# Patient Record
Sex: MALE | NOT HISPANIC OR LATINO | Employment: STUDENT | URBAN - METROPOLITAN AREA
[De-identification: names, ages, dates, MRNs, and addresses within clinical notes are randomized per-mention and may not be internally consistent; named-entity substitution may affect disease eponyms.]

---

## 2019-10-02 ENCOUNTER — TRANSCRIBE ORDERS (OUTPATIENT)
Dept: RADIOLOGY | Facility: HOSPITAL | Age: 29
End: 2019-10-02

## 2019-10-02 ENCOUNTER — HOSPITAL ENCOUNTER (OUTPATIENT)
Dept: RADIOLOGY | Facility: HOSPITAL | Age: 29
Discharge: HOME | End: 2019-10-02
Payer: COMMERCIAL

## 2019-10-02 DIAGNOSIS — R05.9 COUGH: ICD-10-CM

## 2019-10-02 DIAGNOSIS — R05.9 COUGH: Primary | ICD-10-CM

## 2019-10-02 PROCEDURE — 71046 X-RAY EXAM CHEST 2 VIEWS: CPT

## 2020-09-24 PROBLEM — K62.5 GASTROINTESTINAL HEMORRHAGE ASSOCIATED WITH ANORECTAL SOURCE: Status: ACTIVE | Noted: 2020-09-24

## 2020-10-12 ENCOUNTER — TELEPHONE (OUTPATIENT)
Dept: OTHER | Facility: OTHER | Age: 30
End: 2020-10-12

## 2022-01-25 ENCOUNTER — OFFICE VISIT (OUTPATIENT)
Dept: FAMILY MEDICINE CLINIC | Facility: CLINIC | Age: 32
End: 2022-01-25
Payer: COMMERCIAL

## 2022-01-25 VITALS
HEIGHT: 74 IN | DIASTOLIC BLOOD PRESSURE: 100 MMHG | OXYGEN SATURATION: 99 % | BODY MASS INDEX: 25.05 KG/M2 | HEART RATE: 99 BPM | RESPIRATION RATE: 24 BRPM | TEMPERATURE: 99.5 F | WEIGHT: 195.2 LBS | SYSTOLIC BLOOD PRESSURE: 158 MMHG

## 2022-01-25 DIAGNOSIS — R05.9 COUGH: ICD-10-CM

## 2022-01-25 DIAGNOSIS — R03.0 ELEVATED BLOOD PRESSURE READING: Primary | ICD-10-CM

## 2022-01-25 PROCEDURE — 3008F BODY MASS INDEX DOCD: CPT | Performed by: FAMILY MEDICINE

## 2022-01-25 PROCEDURE — 1036F TOBACCO NON-USER: CPT | Performed by: FAMILY MEDICINE

## 2022-01-25 PROCEDURE — 3725F SCREEN DEPRESSION PERFORMED: CPT | Performed by: FAMILY MEDICINE

## 2022-01-25 PROCEDURE — 99203 OFFICE O/P NEW LOW 30 MIN: CPT | Performed by: FAMILY MEDICINE

## 2022-01-25 RX ORDER — METHYLPREDNISOLONE 4 MG/1
TABLET ORAL
COMMUNITY
Start: 2022-01-21 | End: 2022-01-25

## 2022-01-25 NOTE — PROGRESS NOTES
Assessment/Plan: blood pressure may be related to patient being on Medrol  Patient will complete and will have blood pressure rechecked at follow-up visit  Patient have laboratory studies done  Recommend low-salt diet  Patient will see ENT if patient has multiple bouts of recurrent tonsillitis  Patient will follow-up in 1 month       Diagnoses and all orders for this visit:    Elevated blood pressure reading  -     CBC and differential; Future  -     Comprehensive metabolic panel; Future  -     Lipid panel; Future  -     TSH, 3rd generation with Free T4 reflex; Future    Cough  -     CBC and differential; Future  -     Comprehensive metabolic panel; Future  -     Lipid panel; Future  -     TSH, 3rd generation with Free T4 reflex; Future    Other orders  -     Discontinue: methylPREDNISolone 4 MG tablet therapy pack; Take as directed per package instructions  Subjective:        Patient ID: Parmjit Lu is a 27 y o  male  Patient is here as a new patient to establish care  Past medical history surgical history allergies medications family history and social history reviewed at this time  Patient with recent sore throat  Patient completed antibiotics  Patient complaining Medrol Dosepak  He patient using lozenges  The mild cough lingering patient with some mucus production associated with this  No fever  Patient concerned about blood pressure  This is been elevated intermittently in the past   Patient is on Medrol Dosepak  Routinely no chest pain or shortness of breath at rest   Patient does get some shortness of breath with significant exertion  No significant problems with urination or defecation at this time  Patient diagnosed with influenza B two week ago  Patient COVID negative          The following portions of the patient's history were reviewed and updated as appropriate: allergies, current medications, past family history, past medical history, past social history, past surgical history and problem list       Review of Systems   Constitutional: Negative  HENT: Positive for sore throat  Eyes: Negative  Respiratory: Positive for cough  Cardiovascular: Negative  Gastrointestinal: Negative  Endocrine: Negative  Genitourinary: Negative  Musculoskeletal: Negative  Skin: Negative  Allergic/Immunologic: Negative  Neurological: Negative  Hematological: Negative  Psychiatric/Behavioral: Negative  Objective:      BMI Counseling: Body mass index is 25 4 kg/m²  The BMI is above normal  Nutrition recommendations include decreasing portion sizes  Exercise recommendations include moderate physical activity 150 minutes/week  Rationale for BMI follow-up plan is due to patient being overweight or obese  /100 (BP Location: Right arm, Patient Position: Sitting, Cuff Size: Standard)   Pulse 99   Temp 99 5 °F (37 5 °C) (Tympanic)   Resp (!) 24   Ht 6' 1 5" (1 867 m)   Wt 88 5 kg (195 lb 3 2 oz)   SpO2 99%   BMI 25 40 kg/m²          Physical Exam  Vitals and nursing note reviewed  Constitutional:       General: He is not in acute distress  Appearance: Normal appearance  He is not ill-appearing, toxic-appearing or diaphoretic  HENT:      Head: Normocephalic and atraumatic  Right Ear: Tympanic membrane, ear canal and external ear normal  There is no impacted cerumen  Left Ear: Tympanic membrane, ear canal and external ear normal  There is no impacted cerumen  Nose: Nose normal  No congestion or rhinorrhea  Mouth/Throat:      Mouth: Mucous membranes are moist       Pharynx: No oropharyngeal exudate or posterior oropharyngeal erythema  Eyes:      General: No scleral icterus  Right eye: No discharge  Left eye: No discharge  Extraocular Movements: Extraocular movements intact  Conjunctiva/sclera: Conjunctivae normal       Pupils: Pupils are equal, round, and reactive to light  Neck:      Vascular: No carotid bruit  Cardiovascular:      Rate and Rhythm: Normal rate and regular rhythm  Pulses: Normal pulses  Heart sounds: Normal heart sounds  No murmur heard  No friction rub  No gallop  Pulmonary:      Effort: Pulmonary effort is normal  No respiratory distress  Breath sounds: Normal breath sounds  No stridor  No wheezing, rhonchi or rales  Chest:      Chest wall: No tenderness  Musculoskeletal:         General: No swelling, tenderness, deformity or signs of injury  Normal range of motion  Cervical back: Normal range of motion and neck supple  No rigidity  No muscular tenderness  Right lower leg: No edema  Left lower leg: No edema  Lymphadenopathy:      Cervical: No cervical adenopathy  Skin:     General: Skin is warm and dry  Capillary Refill: Capillary refill takes less than 2 seconds  Coloration: Skin is not jaundiced  Findings: No bruising, erythema, lesion or rash  Neurological:      Mental Status: He is alert and oriented to person, place, and time  Mental status is at baseline  Cranial Nerves: No cranial nerve deficit  Sensory: No sensory deficit  Motor: No weakness  Coordination: Coordination normal       Gait: Gait normal    Psychiatric:         Mood and Affect: Mood normal          Behavior: Behavior normal          Thought Content:  Thought content normal          Judgment: Judgment normal

## 2022-03-09 ENCOUNTER — OFFICE VISIT (OUTPATIENT)
Dept: FAMILY MEDICINE CLINIC | Facility: CLINIC | Age: 32
End: 2022-03-09
Payer: COMMERCIAL

## 2022-03-09 VITALS
OXYGEN SATURATION: 100 % | HEART RATE: 98 BPM | DIASTOLIC BLOOD PRESSURE: 90 MMHG | WEIGHT: 189 LBS | HEIGHT: 73 IN | SYSTOLIC BLOOD PRESSURE: 120 MMHG | TEMPERATURE: 96.6 F | BODY MASS INDEX: 25.05 KG/M2

## 2022-03-09 DIAGNOSIS — E78.6 LOW HDL (UNDER 40): ICD-10-CM

## 2022-03-09 DIAGNOSIS — R05.9 COUGH: ICD-10-CM

## 2022-03-09 DIAGNOSIS — R74.01 ELEVATED ALT MEASUREMENT: ICD-10-CM

## 2022-03-09 DIAGNOSIS — I10 PRIMARY HYPERTENSION: Primary | ICD-10-CM

## 2022-03-09 DIAGNOSIS — K21.9 GASTROESOPHAGEAL REFLUX DISEASE WITHOUT ESOPHAGITIS: ICD-10-CM

## 2022-03-09 PROCEDURE — 1036F TOBACCO NON-USER: CPT | Performed by: FAMILY MEDICINE

## 2022-03-09 PROCEDURE — 3008F BODY MASS INDEX DOCD: CPT | Performed by: FAMILY MEDICINE

## 2022-03-09 PROCEDURE — 99214 OFFICE O/P EST MOD 30 MIN: CPT | Performed by: FAMILY MEDICINE

## 2022-03-09 RX ORDER — OMEPRAZOLE 20 MG/1
20 CAPSULE, DELAYED RELEASE ORAL
Qty: 30 CAPSULE | Refills: 5 | Status: SHIPPED | OUTPATIENT
Start: 2022-03-09

## 2022-03-09 NOTE — PROGRESS NOTES
Assessment/Plan:  CBC normal ALT 89  Rest of CMP normal     HDL 33  TSH 2 0  Patient will try to exercise given low HDL  Patient will have dash diet and other pamphlet and information given regarding hypertension  Patient use Colace for constipation  Patient will use omeprazole for GERD/chronic cough which is stable  Patient will follow-up in 2-3 months  Patient will have laboratory studies to recheck elevated ALT  Diagnoses and all orders for this visit:    Primary hypertension    Cough    Gastroesophageal reflux disease without esophagitis  -     omeprazole (PriLOSEC) 20 mg delayed release capsule; Take 1 capsule (20 mg total) by mouth daily before breakfast    Elevated ALT measurement  -     CBC and differential; Future  -     Comprehensive metabolic panel; Future  -     ANAMIKA Screen w/ Reflex to Titer/Pattern; Future  -     C-reactive protein; Future  -     Hepatitis panel, acute; Future  -     Ferritin; Future  -     Ceruloplasmin; Future    Low HDL (under 40)            Subjective:        Patient ID: Elroy Aaron is a 27 y o  male  Patient is here to follow-up on hypertension as well as GERD with chronic cough  Patient did start omeprazole OTC and cough has resolved  No chest pain shortness of breath or abdominal pain  No change urination  Patient did have some neck pain when coughing  No radicular symptoms  Patient did use Tylenol  This is resolved  Patient with full range of motion at this time  No previous cervical trauma  The following portions of the patient's history were reviewed and updated as appropriate: allergies, current medications, past family history, past medical history, past social history, past surgical history and problem list       Review of Systems   Constitutional: Negative  HENT: Negative  Eyes: Negative  Respiratory: Negative  Cardiovascular: Negative  Gastrointestinal: Negative  Endocrine: Negative  Genitourinary: Negative  Musculoskeletal: Negative  Skin: Negative  Allergic/Immunologic: Negative  Neurological: Negative  Hematological: Negative  Psychiatric/Behavioral: Negative  Objective:               /90   Pulse 98   Temp (!) 96 6 °F (35 9 °C)   Ht 6' 1" (1 854 m)   Wt 85 7 kg (189 lb)   SpO2 100%   BMI 24 94 kg/m²          Physical Exam  Vitals and nursing note reviewed  Constitutional:       General: He is not in acute distress  Appearance: Normal appearance  He is not ill-appearing, toxic-appearing or diaphoretic  HENT:      Head: Normocephalic and atraumatic  Right Ear: Tympanic membrane, ear canal and external ear normal  There is no impacted cerumen  Left Ear: Tympanic membrane, ear canal and external ear normal  There is no impacted cerumen  Nose: Nose normal  No congestion or rhinorrhea  Mouth/Throat:      Mouth: Mucous membranes are moist       Pharynx: No oropharyngeal exudate or posterior oropharyngeal erythema  Eyes:      General: No scleral icterus  Right eye: No discharge  Left eye: No discharge  Extraocular Movements: Extraocular movements intact  Conjunctiva/sclera: Conjunctivae normal       Pupils: Pupils are equal, round, and reactive to light  Neck:      Vascular: No carotid bruit  Cardiovascular:      Rate and Rhythm: Normal rate and regular rhythm  Pulses: Normal pulses  Heart sounds: Normal heart sounds  No murmur heard  No friction rub  No gallop  Pulmonary:      Effort: Pulmonary effort is normal  No respiratory distress  Breath sounds: Normal breath sounds  No stridor  No wheezing, rhonchi or rales  Chest:      Chest wall: No tenderness  Musculoskeletal:         General: No swelling, tenderness, deformity or signs of injury  Normal range of motion  Cervical back: Normal range of motion and neck supple  No rigidity  No muscular tenderness  Right lower leg: No edema        Left lower leg: No edema  Lymphadenopathy:      Cervical: No cervical adenopathy  Skin:     General: Skin is warm and dry  Capillary Refill: Capillary refill takes less than 2 seconds  Coloration: Skin is not jaundiced  Findings: No bruising, erythema, lesion or rash  Neurological:      Mental Status: He is alert and oriented to person, place, and time  Mental status is at baseline  Cranial Nerves: No cranial nerve deficit  Sensory: No sensory deficit  Motor: No weakness  Coordination: Coordination normal       Gait: Gait normal    Psychiatric:         Mood and Affect: Mood normal          Behavior: Behavior normal          Thought Content:  Thought content normal          Judgment: Judgment normal

## 2022-03-11 ENCOUNTER — TELEPHONE (OUTPATIENT)
Dept: FAMILY MEDICINE CLINIC | Facility: CLINIC | Age: 32
End: 2022-03-11

## 2022-03-11 NOTE — TELEPHONE ENCOUNTER
Patient called stating that he did not know that he was supposed to fast for the ordered CMP  Spoke with Vonnie Card  And was informed that he could do the test if he wished, but his numbers would be a little off  Patient decided to go have the test done

## 2022-10-12 PROBLEM — R05.9 COUGH: Status: RESOLVED | Noted: 2022-01-25 | Resolved: 2022-10-12

## 2023-02-23 ENCOUNTER — OFFICE VISIT (OUTPATIENT)
Dept: FAMILY MEDICINE CLINIC | Facility: CLINIC | Age: 33
End: 2023-02-23

## 2023-02-23 VITALS
SYSTOLIC BLOOD PRESSURE: 182 MMHG | OXYGEN SATURATION: 99 % | HEART RATE: 87 BPM | TEMPERATURE: 97.5 F | HEIGHT: 73 IN | DIASTOLIC BLOOD PRESSURE: 94 MMHG | WEIGHT: 192.8 LBS | BODY MASS INDEX: 25.55 KG/M2

## 2023-02-23 DIAGNOSIS — Z00.00 WELL ADULT EXAM: Primary | ICD-10-CM

## 2023-02-23 DIAGNOSIS — Z23 ENCOUNTER FOR IMMUNIZATION: ICD-10-CM

## 2023-02-23 NOTE — PROGRESS NOTES
Assessment/Plan: Vaccines reviewed  Adacel shot given at this time  Patient have laboratory studies done  Labs reviewed  No family history of colon or prostate cancer  Patient will have low-salt diet at this time patient also exercising and diet appropriately  Patient [place]  Patient does not wish to start medication for hypertension at this time  Patient understands risk  Recheck blood pressure at surgical biopsy 2 months       Diagnoses and all orders for this visit:    Well adult exam  -     CBC and differential; Future  -     Comprehensive metabolic panel; Future  -     Lipid panel; Future  -     TSH, 3rd generation with Free T4 reflex; Future  -     CBC and differential; Future  -     Comprehensive metabolic panel; Future  -     Lipid panel; Future  -     TSH, 3rd generation with Free T4 reflex; Future            Subjective:        Patient ID: Joey Ruggiero is a 34 y o  male  Patient is here for wellness exam   Labs and vaccines reviewed  No family history of colon or prostate cancer  Patient with rash behind right ear for roughly 1 month  No significant pain or pruritus noted  No topical treatment used as far as creams  The following portions of the patient's history were reviewed and updated as appropriate: allergies, current medications, past family history, past medical history, past social history, past surgical history and problem list       Review of Systems   Constitutional: Negative  HENT: Negative  Eyes: Negative  Respiratory: Negative  Cardiovascular: Negative  Gastrointestinal: Negative  Endocrine: Negative  Genitourinary: Negative  Musculoskeletal: Negative  Skin: Positive for rash  Allergic/Immunologic: Negative  Neurological: Negative  Hematological: Negative  Psychiatric/Behavioral: Negative  Objective:      BMI Counseling: Body mass index is 25 44 kg/m²   The BMI is above normal  Nutrition recommendations include consuming healthier snacks  Exercise recommendations include moderate physical activity 150 minutes/week  Rationale for BMI follow-up plan is due to patient being overweight or obese  Depression Screening and Follow-up Plan: Patient was screened for depression during today's encounter  They screened negative with a PHQ-2 score of 0             BP (!) 180/90 (BP Location: Right arm, Patient Position: Sitting, Cuff Size: Standard)   Pulse 87   Temp 97 5 °F (36 4 °C) (Temporal)   Ht 6' 1" (1 854 m)   Wt 87 5 kg (192 lb 12 8 oz)   SpO2 99%   BMI 25 44 kg/m²          Physical Exam  Vitals and nursing note reviewed  Constitutional:       General: He is not in acute distress  Appearance: Normal appearance  He is not ill-appearing, toxic-appearing or diaphoretic  HENT:      Head: Normocephalic and atraumatic  Right Ear: Tympanic membrane, ear canal and external ear normal  There is no impacted cerumen  Left Ear: Tympanic membrane, ear canal and external ear normal  There is no impacted cerumen  Nose: Nose normal  No congestion or rhinorrhea  Mouth/Throat:      Mouth: Mucous membranes are moist       Pharynx: No oropharyngeal exudate or posterior oropharyngeal erythema  Eyes:      General: No scleral icterus  Right eye: No discharge  Left eye: No discharge  Extraocular Movements: Extraocular movements intact  Conjunctiva/sclera: Conjunctivae normal       Pupils: Pupils are equal, round, and reactive to light  Neck:      Vascular: No carotid bruit  Cardiovascular:      Rate and Rhythm: Normal rate and regular rhythm  Pulses: Normal pulses  Heart sounds: Normal heart sounds  No murmur heard  No friction rub  No gallop  Pulmonary:      Effort: Pulmonary effort is normal  No respiratory distress  Breath sounds: Normal breath sounds  No stridor  No wheezing, rhonchi or rales     Chest: Chest wall: No tenderness  Abdominal:      General: There is no distension  Musculoskeletal:         General: No swelling, tenderness, deformity or signs of injury  Normal range of motion  Cervical back: Normal range of motion and neck supple  No rigidity  No muscular tenderness  Right lower leg: No edema  Left lower leg: No edema  Lymphadenopathy:      Cervical: No cervical adenopathy  Skin:     General: Skin is warm and dry  Capillary Refill: Capillary refill takes less than 2 seconds  Coloration: Skin is not jaundiced  Findings: Lesion present  No bruising, erythema or rash  Comments: Small nodule behind right ear   Neurological:      Mental Status: He is alert and oriented to person, place, and time  Mental status is at baseline  Cranial Nerves: No cranial nerve deficit  Sensory: No sensory deficit  Motor: No weakness  Coordination: Coordination normal       Gait: Gait normal    Psychiatric:         Mood and Affect: Mood normal          Behavior: Behavior normal          Thought Content:  Thought content normal          Judgment: Judgment normal

## 2023-04-24 PROBLEM — Z00.00 WELL ADULT EXAM: Status: RESOLVED | Noted: 2023-02-23 | Resolved: 2023-04-24

## 2023-07-18 ENCOUNTER — OFFICE VISIT (OUTPATIENT)
Dept: FAMILY MEDICINE CLINIC | Facility: CLINIC | Age: 33
End: 2023-07-18
Payer: COMMERCIAL

## 2023-07-18 VITALS
BODY MASS INDEX: 26.11 KG/M2 | HEART RATE: 84 BPM | HEIGHT: 73 IN | TEMPERATURE: 97.4 F | WEIGHT: 197 LBS | DIASTOLIC BLOOD PRESSURE: 100 MMHG | SYSTOLIC BLOOD PRESSURE: 150 MMHG | OXYGEN SATURATION: 99 %

## 2023-07-18 DIAGNOSIS — I10 PRIMARY HYPERTENSION: ICD-10-CM

## 2023-07-18 DIAGNOSIS — S39.012A STRAIN OF LUMBAR REGION, INITIAL ENCOUNTER: Primary | ICD-10-CM

## 2023-07-18 DIAGNOSIS — D49.2 SKIN NEOPLASM: ICD-10-CM

## 2023-07-18 PROCEDURE — 99214 OFFICE O/P EST MOD 30 MIN: CPT | Performed by: FAMILY MEDICINE

## 2023-07-18 NOTE — PROGRESS NOTES
Assessment/Plan: Patient to go to physical therapy. Patient will use Advil as needed. Patient will see dermatology regarding skin lesion on back and behind right ear. CMP CBC TSH lipid profile reviewed. Patient wishes to hold off with antihypertensive medications at present time. Patient was read to have DASH diet/low-salt diet. Follow-up in the next few months. Diagnoses and all orders for this visit:    Strain of lumbar region, initial encounter  -     Ambulatory Referral to Physical Therapy; Future    Skin neoplasm  -     Ambulatory Referral to Dermatology; Future    Primary hypertension            Subjective:        Patient ID: Juni Mancuso is a 34 y.o. male. Patient is here with right-sided lumbar pain over the past 2 weeks or so. Patient status post gym. Patient without any change urination or defecation. No saddle anesthesia. No radicular symptoms in the lower extremities. No fevers noted. Patient did use Advil. Back Pain          The following portions of the patient's history were reviewed and updated as appropriate: allergies, current medications, past family history, past medical history, past social history, past surgical history and problem list.      Review of Systems   Constitutional: Negative. HENT: Negative. Eyes: Negative. Respiratory: Negative. Cardiovascular: Negative. Gastrointestinal: Negative. Endocrine: Negative. Genitourinary: Negative. Musculoskeletal: Positive for back pain. Skin: Negative. Allergic/Immunologic: Negative. Neurological: Negative. Hematological: Negative. Psychiatric/Behavioral: Negative. Objective:               /100   Pulse 84   Temp (!) 97.4 °F (36.3 °C)   Ht 6' 1" (1.854 m)   Wt 89.4 kg (197 lb)   SpO2 99%   BMI 25.99 kg/m²          Physical Exam  Vitals and nursing note reviewed. Constitutional:       General: He is not in acute distress. Appearance: Normal appearance.  He is not ill-appearing, toxic-appearing or diaphoretic. Musculoskeletal:         General: Tenderness present. Comments: Minimal pain at the L4-S1 region on the right lumbar region. Some discomfort with forward flexion but not with extension. Negative straight leg raise bilaterally. Gross sensation to light touch intact bilaterally. Skin:     Findings: Lesion present. Comments: Skin neoplasm behind right ear. Neurological:      Mental Status: He is alert.

## 2023-07-20 ENCOUNTER — EVALUATION (OUTPATIENT)
Dept: PHYSICAL THERAPY | Facility: REHABILITATION | Age: 33
End: 2023-07-20
Payer: COMMERCIAL

## 2023-07-20 DIAGNOSIS — S39.012A STRAIN OF LUMBAR REGION, INITIAL ENCOUNTER: ICD-10-CM

## 2023-07-20 PROCEDURE — 97161 PT EVAL LOW COMPLEX 20 MIN: CPT | Performed by: PHYSICAL THERAPIST

## 2023-07-20 PROCEDURE — 97110 THERAPEUTIC EXERCISES: CPT | Performed by: PHYSICAL THERAPIST

## 2023-07-20 NOTE — PROGRESS NOTES
PT Evaluation     Today's date: 2023  Patient name: Lee Ann Cash  : 1990  MRN: [id number]  Referring provider: Eric Moore DO  Dx:   Encounter Diagnosis     ICD-10-CM    1. Strain of lumbar region, initial encounter  S39.012A Ambulatory Referral to Physical Therapy          Start Time:   Stop Time: 1700  Total time in clinic (min): 45 minutes    Assessment  Assessment details: Problem List:  1) R hip hypomobility  2) Decreased R gluteus medius weakness    Lee Ann Cash is a pleasant 34 y.o. male who presents with R sided low back pain following lifting at the gym. Pain went away, but was re-aggravated when lifting an item at the grocery store. He has R hip hypomobility, decreased R gluteus medius strength, and pain when squatting resulting in the pain he is experiencing, worry over not knowing what's wrong, concern at no signs of improvement and fear of not being able to keep active. No further referral appears necessary at this time based upon examination results. I expect he will improve in 2-3 weeks. Positive prognostic indicators include positive attitude toward recovery and good understanding of diagnosis and treatment plan options. Negative prognostic indicators include hypertension. Jeny would benefit from skilled physical therapy to address his limitations and allow him to return to lifting and working out without pain. Impairments: abnormal or restricted ROM, activity intolerance, impaired physical strength, lacks appropriate home exercise program, pain with function, weight-bearing intolerance and poor posture     Symptom irritability: lowUnderstanding of Dx/Px/POC: good   Prognosis: good    Goals  ST-4 weeks  Patient will be independent with home exercise program.   Patient will be able to manage symptoms independently.   Patient will decrease pain by 25-50%    LTG: by discharge  Patient will improve FOTO to goal  Patient will report minimal (1-2/10) pain with aggravating activities to display improvements in overall functional status  Patient will return to squatting to allow him to get back to his gym routine     Plan  Patient would benefit from: skilled physical therapy  Planned modality interventions: cryotherapy, thermotherapy: hydrocollator packs and unattended electrical stimulation  Planned therapy interventions: IADL retraining, joint mobilization, manual therapy, massage, ADL training, activity modification, abdominal trunk stabilization, ADL retraining, balance, balance/weight bearing training, neuromuscular re-education, body mechanics training, behavior modification, strengthening, stretching, therapeutic activities, therapeutic exercise, therapeutic training, transfer training, graded exercise, graded motor, home exercise program, graded activity, gait training, functional ROM exercises, patient education, postural training, flexibility and nerve gliding  Frequency: 2x week  Duration in visits: 16  Duration in weeks: 8  Treatment plan discussed with: patient        Subjective Evaluation    History of Present Illness  Mechanism of injury: Daniel Sarmiento is a 34 y.o. male presenting to physical therapy on 23 with referral from MD for right sided back pain that began when he was lifting at the gym. Pain did not start until the day after. Pain did improve, but then it was re-aggravated when he was lifting at the grocery. Reports some pain in his groin/testicular area that began shortly after his back started bothering him. Denies pain with coughing/sneezing. Denies numbness/tingling or pain down his leg. Reports some problems sleeping, but pain is almost gone.          Quality of life: good    Patient Goals  Patient goals for therapy: increased strength, independence with ADLs/IADLs, return to sport/leisure activities, decreased pain and increased motion    Pain  Current pain ratin  At best pain ratin  At worst pain ratin  Quality: sharp and dull ache  Relieving factors: rest and relaxation  Aggravating factors: lifting  Progression: improved          Objective    Myotomes: all intact b/l  Dermatome: all intact b/l     GAIT  Squat assess:     Lumbar Spine Protective Mechanism: (+) post/ant on R    Lumbar  % of normal   Flex. 100%   Extn. 100%   SG Left 100% P   SB Right 100%   ROT Left 100%   ROT Right 100%         MMT         AROM          PROM    Hip       L       R        L           R      L     R   Flex. 5 5       Extn. Abd. Add. IR.         ER.                  G. Max 5 5       G. Med. 4+ 4       Iliop.              SI joint:            [place] scan=  (-) medial malleoli           Segmental mobility:   LS= normal              Precautions: HTN    Manuals 7/20                                                                Neuro Re-Ed 7/20                                                                                                       Ther Ex 7/20            Clamshell with sideplank HEP            Hand heel rock IR HEP            [place] stretch HEP            Squat assess QD                                                   HEP/education 9'            Ther Activity                                       Gait Training                                       Modalities

## 2024-01-05 ENCOUNTER — OFFICE VISIT (OUTPATIENT)
Dept: FAMILY MEDICINE CLINIC | Facility: CLINIC | Age: 34
End: 2024-01-05
Payer: COMMERCIAL

## 2024-01-05 VITALS
SYSTOLIC BLOOD PRESSURE: 138 MMHG | OXYGEN SATURATION: 99 % | TEMPERATURE: 98.8 F | DIASTOLIC BLOOD PRESSURE: 86 MMHG | HEIGHT: 73 IN | WEIGHT: 201.8 LBS | BODY MASS INDEX: 26.74 KG/M2 | HEART RATE: 73 BPM

## 2024-01-05 DIAGNOSIS — Z23 ENCOUNTER FOR IMMUNIZATION: ICD-10-CM

## 2024-01-05 DIAGNOSIS — L81.4 LENTIGO: Primary | ICD-10-CM

## 2024-01-05 DIAGNOSIS — Z71.84 TRAVEL ADVICE ENCOUNTER: ICD-10-CM

## 2024-01-05 PROCEDURE — 90686 IIV4 VACC NO PRSV 0.5 ML IM: CPT

## 2024-01-05 PROCEDURE — 99213 OFFICE O/P EST LOW 20 MIN: CPT | Performed by: FAMILY MEDICINE

## 2024-01-05 PROCEDURE — 90471 IMMUNIZATION ADMIN: CPT

## 2024-01-05 RX ORDER — DOXYCYCLINE HYCLATE 100 MG/1
100 CAPSULE ORAL EVERY 12 HOURS SCHEDULED
Qty: 14 CAPSULE | Refills: 0 | Status: SHIPPED | OUTPATIENT
Start: 2024-01-05 | End: 2024-01-12

## 2024-01-05 NOTE — PROGRESS NOTES
"Assessment/Plan: Patient will need flu shot and hepatitis A.  Patient will be given prescription for doxycycline to be used as needed for cholera if this would occur.  Patient will observe lentigo.  All questions answered.  Follow-up as needed       Diagnoses and all orders for this visit:    Lentigo    Encounter for immunization  -     influenza vaccine, quadrivalent, 0.5 mL, preservative-free, for adult and pediatric patients 6 mos+ (AFLURIA, FLUARIX, FLULAVAL, FLUZONE)    Travel advice encounter  -     doxycycline hyclate (VIBRAMYCIN) 100 mg capsule; Take 1 capsule (100 mg total) by mouth every 12 (twelve) hours for 7 days            Subjective:        Patient ID: Jeny Guerra is a 33 y.o. male.      Patient is here for vaccines.  Patient traveling to [facility] at the end of January.  Patient also with skin lesion on right posterior shoulder for multiple years.  No significant change.          The following portions of the patient's history were reviewed and updated as appropriate: allergies, current medications, past family history, past medical history, past social history, past surgical history and problem list.      Review of Systems   Constitutional: Negative.    HENT: Negative.     Eyes: Negative.    Respiratory: Negative.     Cardiovascular: Negative.    Gastrointestinal: Negative.    Endocrine: Negative.    Genitourinary: Negative.    Musculoskeletal: Negative.    Skin:  Positive for color change.   Allergic/Immunologic: Negative.    Neurological: Negative.    Hematological: Negative.    Psychiatric/Behavioral: Negative.             Objective:               /86 (BP Location: Left arm, Patient Position: Sitting, Cuff Size: Standard)   Pulse 73   Temp 98.8 °F (37.1 °C) (Temporal)   Ht 6' 1\" (1.854 m)   Wt 91.5 kg (201 lb 12.8 oz)   SpO2 99%   BMI 26.62 kg/m²          Physical Exam  Vitals and nursing note reviewed.   Constitutional:       Appearance: Normal appearance.   Skin:     Findings: " Lesion present.      Comments: Lentigo right posterior shoulder.   Neurological:      Mental Status: He is alert.

## 2024-01-19 ENCOUNTER — TELEPHONE (OUTPATIENT)
Age: 34
End: 2024-01-19

## 2024-01-19 ENCOUNTER — TELEPHONE (OUTPATIENT)
Dept: FAMILY MEDICINE CLINIC | Facility: CLINIC | Age: 34
End: 2024-01-19

## 2024-01-19 DIAGNOSIS — Z71.84 TRAVEL ADVICE ENCOUNTER: Primary | ICD-10-CM

## 2024-01-19 RX ORDER — DOXYCYCLINE HYCLATE 100 MG/1
100 CAPSULE ORAL EVERY 12 HOURS SCHEDULED
Qty: 14 CAPSULE | Refills: 0 | Status: SHIPPED | OUTPATIENT
Start: 2024-01-19 | End: 2024-01-26

## 2024-01-19 NOTE — TELEPHONE ENCOUNTER
Patient calling to ask about having prescription transferred to his local pharmacy. He is out of town currently and did not pick it up at the time it was ordered. Warm transfer successful to Rx refill specialist Nita for further assistance.

## 2024-01-19 NOTE — TELEPHONE ENCOUNTER
YASMIN Camacho    Patient would like to get this medication refilled, it isn't on his current med list.    Patient is in [place] and needs a refill    doxycycline hyclate (VIBRAMYCIN) 100 mg capsule   14 Capsules    Pharmacy:  [address] [address]  Phone: [phone]     Patient: [phone]

## 2024-03-15 ENCOUNTER — TELEPHONE (OUTPATIENT)
Age: 34
End: 2024-03-15

## 2024-03-15 ENCOUNTER — OFFICE VISIT (OUTPATIENT)
Dept: FAMILY MEDICINE CLINIC | Facility: CLINIC | Age: 34
End: 2024-03-15
Payer: COMMERCIAL

## 2024-03-15 VITALS
HEIGHT: 73 IN | OXYGEN SATURATION: 98 % | WEIGHT: 201 LBS | RESPIRATION RATE: 16 BRPM | TEMPERATURE: 98 F | SYSTOLIC BLOOD PRESSURE: 120 MMHG | DIASTOLIC BLOOD PRESSURE: 80 MMHG | HEART RATE: 90 BPM | BODY MASS INDEX: 26.64 KG/M2

## 2024-03-15 DIAGNOSIS — Z00.00 WELL ADULT EXAM: Primary | ICD-10-CM

## 2024-03-15 DIAGNOSIS — T78.40XA ALLERGIC REACTION, INITIAL ENCOUNTER: ICD-10-CM

## 2024-03-15 LAB
ALBUMIN SERPL-MCNC: 4.6 G/DL (ref 3.5–5.7)
ALP SERPL-CCNC: 69 U/L (ref 35–120)
ALT SERPL-CCNC: 16 U/L
ANION GAP SERPL CALCULATED.3IONS-SCNC: 10 MMOL/L (ref 3–11)
AST SERPL-CCNC: 19 U/L
BASOPHILS # BLD AUTO: 0 THOU/CMM (ref 0–0.1)
BASOPHILS NFR BLD AUTO: 0 %
BILIRUB SERPL-MCNC: 0.6 MG/DL (ref 0.2–1)
BUN SERPL-MCNC: 16 MG/DL (ref 7–28)
CALCIUM SERPL-MCNC: 9 MG/DL (ref 8.5–10.1)
CHLORIDE SERPL-SCNC: 103 MMOL/L (ref 100–109)
CHOLEST SERPL-MCNC: 152 MG/DL
CHOLEST/HDLC SERPL: 3.5 {RATIO}
CO2 SERPL-SCNC: 27 MMOL/L (ref 21–31)
CREAT SERPL-MCNC: 0.84 MG/DL (ref 0.53–1.3)
CYTOLOGY CMNT CVX/VAG CYTO-IMP: NORMAL
DIFFERENTIAL METHOD BLD: NORMAL
EOSINOPHIL # BLD AUTO: 0.2 THOU/CMM (ref 0–0.5)
EOSINOPHIL NFR BLD AUTO: 4 %
ERYTHROCYTE [DISTWIDTH] IN BLOOD BY AUTOMATED COUNT: 13.2 % (ref 12–16)
GFR/BSA.PRED SERPLBLD CYS-BASED-ARV: 117 ML/MIN/{1.73_M2}
GLUCOSE SERPL-MCNC: 88 MG/DL (ref 65–99)
HCT VFR BLD AUTO: 45.2 % (ref 37–48)
HDLC SERPL-MCNC: 44 MG/DL (ref 23–92)
HGB BLD-MCNC: 15.8 G/DL (ref 12.5–17)
LDLC SERPL CALC-MCNC: 87 MG/DL
LYMPHOCYTES # BLD AUTO: 1.4 THOU/CMM (ref 1–3)
LYMPHOCYTES NFR BLD AUTO: 24 %
MCH RBC QN AUTO: 29.9 PG (ref 27–36)
MCHC RBC AUTO-ENTMCNC: 35 G/DL (ref 32–37)
MCV RBC AUTO: 86 FL (ref 80–100)
MONOCYTES # BLD AUTO: 0.4 THOU/CMM (ref 0.3–1)
MONOCYTES NFR BLD AUTO: 7 %
NEUTROPHILS # BLD AUTO: 3.7 THOU/CMM (ref 1.8–7.8)
NEUTROPHILS NFR BLD AUTO: 65 %
NONHDLC SERPL-MCNC: 108 MG/DL
PLATELET # BLD AUTO: 212 THOU/CMM (ref 140–350)
PMV BLD REES-ECKER: 8.4 FL (ref 7.5–11.3)
POTASSIUM SERPL-SCNC: 4 MMOL/L (ref 3.5–5.2)
PROT SERPL-MCNC: 6.9 G/DL (ref 6.3–8.3)
RBC # BLD AUTO: 5.28 MILL/CMM (ref 4–5.4)
SODIUM SERPL-SCNC: 140 MMOL/L (ref 135–145)
TRIGL SERPL-MCNC: 103 MG/DL
TSH SERPL-ACNC: 3.07 UIU/ML (ref 0.45–5.33)
WBC # BLD AUTO: 5.7 THOU/CMM (ref 4–10.5)

## 2024-03-15 PROCEDURE — 99395 PREV VISIT EST AGE 18-39: CPT | Performed by: FAMILY MEDICINE

## 2024-03-15 RX ORDER — PREDNISONE 10 MG/1
TABLET ORAL
Qty: 30 TABLET | Refills: 0 | Status: SHIPPED | OUTPATIENT
Start: 2024-03-15

## 2024-03-15 NOTE — TELEPHONE ENCOUNTER
Spoke with pt, he was confused about the Benadryl.  Told him it was over the counter,  he understood

## 2024-03-15 NOTE — TELEPHONE ENCOUNTER
Pt called in saying he was seen at the practice today by Dr. Brown and during the visit he was told will be prescribing with 2 difference pills. But he has just collected the med from pharmacy received only 1 pill asking doctor to send in the prescription for the other pill too so he can collect it today. Please do call him to update on the same.

## 2024-03-15 NOTE — PROGRESS NOTES
Assessment/Plan: Wellness exam done at this time.  Vaccines up-to-date.  Patient further laboratory studies at this time.  Patient will have laboratory studies for allergic reaction.  Patient will go back to using the old soap that he used to use.  Patient will refrain from using band as well as creatine at this time.  Patient has Benadryl at night.  Patient use prednisone taper as directed.       Diagnoses and all orders for this visit:    Well adult exam  -     Comprehensive metabolic panel; Future  -     CBC and differential; Future  -     TSH, 3rd generation with Free T4 reflex; Future  -     Lipid panel; Future    Allergic reaction, initial encounter  -     [facility] Allergy Panel, Adult; Future  -     Food Allergy Profile; Future  -     predniSONE 10 mg tablet; 5 pills daily for 2 days, 4 for 2 days, 3 for 2 days, 2 for 2 days, 1 for 2 days.            Subjective:        Patient ID: Jeny Guerra is a 33 y.o. male.      Patient is here for wellness exam.  Labs vaccines reviewed.  No known family history of colon or prostate cancer.  Patient with rash related to band for exercise bilateral arms.  Patient also started taking creatine roughly 3 weeks ago.  Patient to use clotrimazole.  No improvement.          The following portions of the patient's history were reviewed and updated as appropriate: allergies, current medications, past family history, past medical history, past social history, past surgical history and problem list.      Review of Systems   Constitutional: Negative.    HENT: Negative.     Eyes: Negative.    Respiratory: Negative.     Cardiovascular: Negative.    Gastrointestinal: Negative.    Endocrine: Negative.    Genitourinary: Negative.    Musculoskeletal: Negative.    Skin:  Positive for rash.   Allergic/Immunologic: Negative.    Neurological: Negative.    Hematological: Negative.    Psychiatric/Behavioral: Negative.             Objective:        Depression Screening and Follow-up Plan:  "Patient was screened for depression during today's encounter. They screened negative with a PHQ-2 score of 0.            /80   Pulse 90   Temp 98 °F (36.7 °C)   Resp 16   Ht 6' 1\" (1.854 m)   Wt 91.2 kg (201 lb)   SpO2 98%   BMI 26.52 kg/m²          Physical Exam    "

## 2024-03-20 LAB
A ALTERNATA IGE QN: 1.3 KU/L
A FUMIGATUS IGE QN: <0.1 KU/L
ALLERGENS TESTED: ABNORMAL
ALLERGENS TESTED: NORMAL
ALMOND IGE QN: <0.1 KU/L
BERMUDA GRASS IGE QN: <0.1 KU/L
BOXELDER IGE QN: <0.1 KU/L
C HERBARUM IGE QN: <0.1 KU/L
CALIF WALNUT POLN IGE QN: 0.46 KU/L
CASHEW NUT IGE QN: <0.1 KU/L
CAT DANDER IGE QN: <0.1 KU/L
COCKSFOOT IGE QN: <0.1 KU/L
CODFISH IGE QN: <0.1 KU/L
COMMON RAGWEED IGE QN: <0.1 KU/L
COW MILK IGE QN: <0.1 KU/L
D FARINAE IGE QN: <0.1 KU/L
D PTERONYSS IGE QN: <0.1 KU/L
DOG DANDER IGE QN: <0.1 KU/L
EGG WHITE IGE QN: <0.1 KU/L
ENGL PLANTAIN IGE QN: <0.1 KU/L
GOOSEFOOT IGE QN: 0.3 KU/L
HAZELNUT IGE QN: <0.1 KU/L
HOUSE DUST HS IGE QN: <0.1 KU/L
KENT BLUE GRASS IGE QN: <0.1 KU/L
MOUSE URINE PROT IGE QN: <0.1 KIU/ML
P NOTATUM IGE QN: <0.1 KU/L
PEANUT IGE QN: <0.1 KU/L
PER RYE GRASS IGE QN: <0.1 KU/L
REF LAB TEST REF RANGE: ABNORMAL
REF LAB TEST REF RANGE: NORMAL
ROACH IGE QN: <0.1 KU/L
S ROSTRATA IGE QN: <0.1 KU/L
SALMON IGE QN: <0.1 KU/L
SALTWORT IGE QN: 1.4 KU/L
SCALLOP IGE QN: <0.1 KU/L
SESAME SEED IGE QN: <0.1 KU/L
SHRIMP IGE QN: <0.1 KU/L
SILVER BIRCH IGE QN: <0.1 KU/L
SOYBEAN IGE QN: <0.1 KU/L
TIMOTHY IGE QN: <0.1 KU/L
TUNA IGE QN: <0.1 KU/L
WALNUT IGE QN: <0.1 KU/L
WHEAT IGE QN: <0.1 KU/L
WHITE ELM IGE QN: <0.1 KU/L
WHITE OAK IGE QN: <0.1 KU/L

## 2024-03-20 NOTE — RESULT ENCOUNTER NOTE
Spoke with patient and advised of results. Patient verbalized understanding. No further questions or concerns at the time of call.

## 2024-05-01 PROBLEM — Z00.00 WELL ADULT EXAM: Status: RESOLVED | Noted: 2024-03-15 | Resolved: 2024-05-01

## 2024-08-14 ENCOUNTER — OFFICE VISIT (OUTPATIENT)
Dept: FAMILY MEDICINE CLINIC | Facility: CLINIC | Age: 34
End: 2024-08-14
Payer: COMMERCIAL

## 2024-08-14 VITALS
SYSTOLIC BLOOD PRESSURE: 150 MMHG | BODY MASS INDEX: 26.08 KG/M2 | DIASTOLIC BLOOD PRESSURE: 100 MMHG | TEMPERATURE: 99 F | RESPIRATION RATE: 21 BRPM | WEIGHT: 196.8 LBS | HEART RATE: 76 BPM | OXYGEN SATURATION: 99 % | HEIGHT: 73 IN

## 2024-08-14 DIAGNOSIS — K21.9 GASTROESOPHAGEAL REFLUX DISEASE WITHOUT ESOPHAGITIS: Primary | ICD-10-CM

## 2024-08-14 PROCEDURE — 99213 OFFICE O/P EST LOW 20 MIN: CPT | Performed by: FAMILY MEDICINE

## 2024-08-14 RX ORDER — OMEPRAZOLE 20 MG/1
20 TABLET, DELAYED RELEASE ORAL DAILY
COMMUNITY
End: 2024-08-14

## 2024-08-14 RX ORDER — OMEPRAZOLE 40 MG/1
40 CAPSULE, DELAYED RELEASE ORAL
Qty: 90 CAPSULE | Refills: 0 | Status: SHIPPED | OUTPATIENT
Start: 2024-08-14 | End: 2025-08-09

## 2024-08-14 NOTE — PROGRESS NOTES
Assessment/Plan: Patient will use omeprazole 40 mg in the morning as directed for reflux.  Guidance given in this regard.  Patient will follow-up in 2 to 3 months if not seeing improvement.       Diagnoses and all orders for this visit:    Gastroesophageal reflux disease without esophagitis  -     omeprazole (PriLOSEC) 40 MG capsule; Take 1 capsule (40 mg total) by mouth daily before breakfast    Other orders  -     Discontinue: omeprazole (PriLOSEC OTC) 20 MG tablet; Take 20 mg by mouth daily            Subjective:        Patient ID: Jeny Guerra is a 33 y.o. male.      Patient is here with some epigastric pain over the past 3 to 4 weeks.  Patient pain does come intermittently.  Patient with cough also.  Patient did start omeprazole 20 mg daily Sunday.  Pain is still persisting but cough is less overall.  No fever noted.  No sputum production noted.  No other chest pain shortness of breath.  Normal urination and defecation at this time.  No radiation of pain to the back.  No worsening with food.    Abdominal Pain          The following portions of the patient's history were reviewed and updated as appropriate: allergies, current medications, past family history, past medical history, past social history, past surgical history and problem list.      Review of Systems   Constitutional: Negative.    HENT: Negative.     Eyes: Negative.    Respiratory:  Positive for cough.    Cardiovascular: Negative.    Gastrointestinal:  Positive for abdominal pain.   Endocrine: Negative.    Genitourinary: Negative.    Musculoskeletal: Negative.    Skin: Negative.    Allergic/Immunologic: Negative.    Neurological: Negative.    Hematological: Negative.    Psychiatric/Behavioral: Negative.             Objective:        Depression Screening and Follow-up Plan: Patient was screened for depression during today's encounter. They screened negative with a PHQ-2 score of 0.            /100 (BP Location: Left arm, Patient Position:  "Sitting, Cuff Size: Standard)   Pulse 76   Temp 99 °F (37.2 °C) (Temporal)   Resp 21   Ht 6' 1\" (1.854 m)   Wt 89.3 kg (196 lb 12.8 oz)   SpO2 99%   BMI 25.96 kg/m²          Physical Exam  Vitals and nursing note reviewed.   Constitutional:       General: He is not in acute distress.     Appearance: Normal appearance. He is not ill-appearing, toxic-appearing or diaphoretic.   HENT:      Head: Normocephalic and atraumatic.   Cardiovascular:      Rate and Rhythm: Normal rate and regular rhythm.      Pulses: Normal pulses.      Heart sounds: Normal heart sounds.   Pulmonary:      Effort: Pulmonary effort is normal.      Breath sounds: Normal breath sounds.   Abdominal:      General: Abdomen is flat. There is no distension.      Palpations: There is no mass.      Tenderness: There is no abdominal tenderness. There is no guarding or rebound.   Skin:     Capillary Refill: Capillary refill takes less than 2 seconds.   Neurological:      Mental Status: He is alert.         "

## 2024-11-05 DIAGNOSIS — K21.9 GASTROESOPHAGEAL REFLUX DISEASE WITHOUT ESOPHAGITIS: ICD-10-CM

## 2024-11-06 RX ORDER — OMEPRAZOLE 40 MG/1
40 CAPSULE, DELAYED RELEASE ORAL
Qty: 90 CAPSULE | Refills: 1 | Status: SHIPPED | OUTPATIENT
Start: 2024-11-06

## 2025-03-12 ENCOUNTER — OFFICE VISIT (OUTPATIENT)
Age: 35
End: 2025-03-12
Payer: COMMERCIAL

## 2025-03-12 VITALS
OXYGEN SATURATION: 98 % | SYSTOLIC BLOOD PRESSURE: 120 MMHG | BODY MASS INDEX: 25.98 KG/M2 | WEIGHT: 196 LBS | HEART RATE: 74 BPM | TEMPERATURE: 97.8 F | HEIGHT: 73 IN | RESPIRATION RATE: 18 BRPM | DIASTOLIC BLOOD PRESSURE: 80 MMHG

## 2025-03-12 DIAGNOSIS — Z00.00 WELL ADULT EXAM: Primary | ICD-10-CM

## 2025-03-12 PROCEDURE — 99395 PREV VISIT EST AGE 18-39: CPT | Performed by: FAMILY MEDICINE

## 2025-03-12 NOTE — PROGRESS NOTES
"Assessment/Plan:       Diagnoses and all orders for this visit:    Well adult exam  Comments:  Labs and vaccines reviewed.  Patient will continue to exercise and diet appropriately.  Check labs.  No early family history of colorectal cancer/prostate ca  Orders:  -     CBC and differential; Future  -     Comprehensive metabolic panel; Future  -     TSH, 3rd generation with Free T4 reflex; Future  -     Lipid panel; Future  -     CBC and differential  -     Comprehensive metabolic panel  -     TSH, 3rd generation with Free T4 reflex  -     Lipid panel            Subjective:        Patient ID: Jeny Guerra is a 34 y.o. male.      Patient is here for wellness exam.  Labs vaccines reviewed.  No early family history of colon or prostate cancer.  No change in urination or defecation.  No chest pain or shortness of breath at this time.  Reflux symptoms have resolved after 2 months of omeprazole.  Patient is off all medications at this time.          The following portions of the patient's history were reviewed and updated as appropriate: allergies, current medications, past family history, past medical history, past social history, past surgical history and problem list.      Review of Systems   Constitutional: Negative.    HENT: Negative.     Eyes: Negative.    Respiratory: Negative.     Cardiovascular: Negative.    Gastrointestinal: Negative.    Endocrine: Negative.    Genitourinary: Negative.    Musculoskeletal: Negative.    Skin: Negative.    Allergic/Immunologic: Negative.    Neurological: Negative.    Hematological: Negative.    Psychiatric/Behavioral: Negative.             Objective:        Depression Screening and Follow-up Plan: Patient was screened for depression during today's encounter. They screened negative with a PHQ-2 score of 0.              /80 (Cuff Size: Large)   Pulse 74   Temp 97.8 °F (36.6 °C)   Resp 18   Ht 6' 1\" (1.854 m)   Wt 88.9 kg (196 lb)   SpO2 98%   BMI 25.86 kg/m²          " Physical Exam  Vitals and nursing note reviewed.   Constitutional:       General: He is not in acute distress.     Appearance: Normal appearance. He is not ill-appearing, toxic-appearing or diaphoretic.   HENT:      Head: Normocephalic and atraumatic.      Right Ear: Tympanic membrane, ear canal and external ear normal. There is no impacted cerumen.      Left Ear: Tympanic membrane, ear canal and external ear normal. There is no impacted cerumen.      Nose: Nose normal. No congestion or rhinorrhea.      Mouth/Throat:      Mouth: Mucous membranes are moist.      Pharynx: No oropharyngeal exudate or posterior oropharyngeal erythema.   Eyes:      General: No scleral icterus.        Right eye: No discharge.         Left eye: No discharge.   Neck:      Vascular: No carotid bruit.   Cardiovascular:      Rate and Rhythm: Normal rate and regular rhythm.      Pulses: Normal pulses.      Heart sounds: Normal heart sounds. No murmur heard.     No friction rub. No gallop.   Pulmonary:      Effort: Pulmonary effort is normal. No respiratory distress.      Breath sounds: Normal breath sounds. No stridor. No wheezing, rhonchi or rales.   Chest:      Chest wall: No tenderness.   Abdominal:      General: Abdomen is flat. Bowel sounds are normal. There is no distension.      Palpations: Abdomen is soft.      Tenderness: There is no abdominal tenderness. There is no guarding or rebound.   Musculoskeletal:         General: No swelling, tenderness, deformity or signs of injury. Normal range of motion.      Cervical back: Normal range of motion and neck supple. No rigidity. No muscular tenderness.      Right lower leg: No edema.      Left lower leg: No edema.   Lymphadenopathy:      Cervical: No cervical adenopathy.   Skin:     General: Skin is warm and dry.      Capillary Refill: Capillary refill takes less than 2 seconds.      Coloration: Skin is not jaundiced.      Findings: No bruising, erythema, lesion or rash.   Neurological:       Mental Status: He is alert and oriented to person, place, and time. Mental status is at baseline.      Cranial Nerves: No cranial nerve deficit.      Sensory: No sensory deficit.      Motor: No weakness.      Coordination: Coordination normal.      Gait: Gait normal.   Psychiatric:         Mood and Affect: Mood normal.         Behavior: Behavior normal.         Thought Content: Thought content normal.         Judgment: Judgment normal.

## 2025-03-14 LAB
ALBUMIN SERPL-MCNC: 4.6 G/DL (ref 3.5–5.7)
ALP SERPL-CCNC: 73 U/L (ref 35–120)
ALT SERPL-CCNC: 11 U/L
ANION GAP SERPL CALCULATED.3IONS-SCNC: 7 MMOL/L (ref 3–11)
AST SERPL-CCNC: 12 U/L
BASOPHILS # BLD AUTO: 0 THOU/CMM (ref 0–0.1)
BASOPHILS NFR BLD AUTO: 0 %
BILIRUB SERPL-MCNC: 0.7 MG/DL (ref 0.2–1)
BUN SERPL-MCNC: 18 MG/DL (ref 7–28)
CALCIUM SERPL-MCNC: 8.9 MG/DL (ref 8.5–10.5)
CHLORIDE SERPL-SCNC: 104 MMOL/L (ref 100–109)
CHOLEST SERPL-MCNC: 172 MG/DL
CHOLEST/HDLC SERPL: 3.7 {RATIO}
CO2 SERPL-SCNC: 31 MMOL/L (ref 21–31)
CREAT SERPL-MCNC: 0.88 MG/DL (ref 0.53–1.3)
CYTOLOGY CMNT CVX/VAG CYTO-IMP: NORMAL
DIFFERENTIAL METHOD BLD: NORMAL
EOSINOPHIL # BLD AUTO: 0.2 THOU/CMM (ref 0–0.5)
EOSINOPHIL NFR BLD AUTO: 4 %
ERYTHROCYTE [DISTWIDTH] IN BLOOD BY AUTOMATED COUNT: 12.8 % (ref 12–16)
GFR/BSA.PRED SERPLBLD CYS-BASED-ARV: 115 ML/MIN/{1.73_M2}
GLUCOSE SERPL-MCNC: 92 MG/DL (ref 65–99)
HCT VFR BLD AUTO: 45.8 % (ref 37–48)
HDLC SERPL-MCNC: 46 MG/DL (ref 23–92)
HGB BLD-MCNC: 16.2 G/DL (ref 12.5–17)
LDLC SERPL CALC-MCNC: 96 MG/DL
LYMPHOCYTES # BLD AUTO: 1.9 THOU/CMM (ref 1–3)
LYMPHOCYTES NFR BLD AUTO: 31 %
MCH RBC QN AUTO: 30.3 PG (ref 27–36)
MCHC RBC AUTO-ENTMCNC: 35.2 G/DL (ref 32–37)
MCV RBC AUTO: 86 FL (ref 80–100)
MONOCYTES # BLD AUTO: 0.5 THOU/CMM (ref 0.3–1)
MONOCYTES NFR BLD AUTO: 8 %
NEUTROPHILS # BLD AUTO: 3.4 THOU/CMM (ref 1.8–7.8)
NEUTROPHILS NFR BLD AUTO: 57 %
NONHDLC SERPL-MCNC: 126 MG/DL
PLATELET # BLD AUTO: 223 THOU/CMM (ref 140–350)
PMV BLD REES-ECKER: 8.1 FL (ref 7.5–11.3)
POTASSIUM SERPL-SCNC: 3.9 MMOL/L (ref 3.5–5.2)
PROT SERPL-MCNC: 7.1 G/DL (ref 6.3–8.3)
RBC # BLD AUTO: 5.33 MILL/CMM (ref 4–5.4)
SODIUM SERPL-SCNC: 142 MMOL/L (ref 135–145)
TRIGL SERPL-MCNC: 148 MG/DL
TSH SERPL-ACNC: 3.11 UIU/ML (ref 0.45–5.33)
WBC # BLD AUTO: 6 THOU/CMM (ref 4–10.5)

## 2025-03-16 ENCOUNTER — RESULTS FOLLOW-UP (OUTPATIENT)
Age: 35
End: 2025-03-16

## 2025-04-11 PROBLEM — Z00.00 WELL ADULT EXAM: Status: RESOLVED | Noted: 2025-03-12 | Resolved: 2025-04-11
